# Patient Record
Sex: MALE | Race: WHITE | URBAN - METROPOLITAN AREA
[De-identification: names, ages, dates, MRNs, and addresses within clinical notes are randomized per-mention and may not be internally consistent; named-entity substitution may affect disease eponyms.]

---

## 2019-01-16 ENCOUNTER — APPOINTMENT (RX ONLY)
Dept: URBAN - METROPOLITAN AREA CLINIC 92 | Facility: CLINIC | Age: 84
Setting detail: DERMATOLOGY
End: 2019-01-16

## 2019-01-16 DIAGNOSIS — L27.1 LOCALIZED SKIN ERUPTION DUE TO DRUGS AND MEDICAMENTS TAKEN INTERNALLY: ICD-10-CM

## 2019-01-16 DIAGNOSIS — L29.8 OTHER PRURITUS: ICD-10-CM

## 2019-01-16 DIAGNOSIS — L29.89 OTHER PRURITUS: ICD-10-CM

## 2019-01-16 PROBLEM — L30.9 DERMATITIS, UNSPECIFIED: Status: ACTIVE | Noted: 2019-01-16

## 2019-01-16 PROCEDURE — 99213 OFFICE O/P EST LOW 20 MIN: CPT

## 2019-01-16 PROCEDURE — ? OTHER

## 2019-01-16 PROCEDURE — ? COUNSELING

## 2019-01-16 PROCEDURE — ? TREATMENT REGIMEN

## 2019-01-16 ASSESSMENT — PAIN INTENSITY VAS: HOW INTENSE IS YOUR PAIN 0 BEING NO PAIN, 10 BEING THE MOST SEVERE PAIN POSSIBLE?: NO PAIN

## 2019-01-16 ASSESSMENT — LOCATION DETAILED DESCRIPTION DERM
LOCATION DETAILED: LEFT INFERIOR VERMILION LIP
LOCATION DETAILED: RIGHT INFERIOR LATERAL MIDBACK

## 2019-01-16 ASSESSMENT — LOCATION SIMPLE DESCRIPTION DERM
LOCATION SIMPLE: LEFT LIP
LOCATION SIMPLE: RIGHT LOWER BACK

## 2019-01-16 ASSESSMENT — LOCATION ZONE DERM
LOCATION ZONE: TRUNK
LOCATION ZONE: LIP

## 2019-01-16 ASSESSMENT — SEVERITY ASSESSMENT: SEVERITY: MILD

## 2019-01-16 NOTE — PROCEDURE: OTHER
Note Text (......Xxx Chief Complaint.): This diagnosis correlates with the
Other (Free Text): -pt hasn’t been on any new medications in the last year; he says that he did take amoxicillin for a month a while ago but isn’t sure it’s correlated \\n-discussed possibly that this is caused by certain medications like ibuprofen/ Tylenol/ Advil but pt doesn’t take pain killers often \\n-if unclear with topical steroid samples in 2-3 weeks plan bx
Detail Level: Zone
Other (Free Text): -discussed that there is no visible area of irritation \\n-pt has shingles in the area that is currently itchy
Other (Free Text): there is an erythematous thin scaly plaque on the left cutaneous lip.  The buccal mucosa and tongue appear normal.  Disc possible FDE vs contact vs actinic cheilitis - not as rough as would expect for AK

## 2019-01-16 NOTE — HPI: ITCHING
How Did Your Itching Occur?: gradual in onset  (over a period of years)
How Severe Is Your Itching?: moderate
Additional History: Pt says he is itchy along the right side of his torso.

## 2019-01-16 NOTE — PROCEDURE: TREATMENT REGIMEN
Initiate Treatment: Apply cordran to lip once a day for a week
Detail Level: Zone
Initiate Treatment: Apply cordran to area when itchy

## 2019-01-16 NOTE — HPI: BODY LOCATION - LIP
How Severe Is Your Condition?: moderate
Additional History: Pt is here to check up in a swollen area on his lower lip. He says some days it is more swollen than other doesn’t hurt or bleed and he doesn’t think he started anything new before the swelling began

## 2019-01-30 ENCOUNTER — APPOINTMENT (RX ONLY)
Dept: URBAN - METROPOLITAN AREA CLINIC 92 | Facility: CLINIC | Age: 84
Setting detail: DERMATOLOGY
End: 2019-01-30

## 2019-01-30 DIAGNOSIS — L29.8 OTHER PRURITUS: ICD-10-CM

## 2019-01-30 DIAGNOSIS — L27.1 LOCALIZED SKIN ERUPTION DUE TO DRUGS AND MEDICAMENTS TAKEN INTERNALLY: ICD-10-CM | Status: IMPROVED

## 2019-01-30 DIAGNOSIS — L29.89 OTHER PRURITUS: ICD-10-CM

## 2019-01-30 PROBLEM — L30.9 DERMATITIS, UNSPECIFIED: Status: ACTIVE | Noted: 2019-01-30

## 2019-01-30 PROCEDURE — ? COUNSELING

## 2019-01-30 PROCEDURE — ? OTHER

## 2019-01-30 PROCEDURE — 99213 OFFICE O/P EST LOW 20 MIN: CPT

## 2019-01-30 PROCEDURE — ? TREATMENT REGIMEN

## 2019-01-30 ASSESSMENT — LOCATION DETAILED DESCRIPTION DERM
LOCATION DETAILED: RIGHT INFERIOR LATERAL MIDBACK
LOCATION DETAILED: LEFT INFERIOR VERMILION LIP

## 2019-01-30 ASSESSMENT — SEVERITY ASSESSMENT: SEVERITY: ALMOST CLEAR

## 2019-01-30 ASSESSMENT — LOCATION ZONE DERM
LOCATION ZONE: LIP
LOCATION ZONE: TRUNK

## 2019-01-30 ASSESSMENT — LOCATION SIMPLE DESCRIPTION DERM
LOCATION SIMPLE: RIGHT LOWER BACK
LOCATION SIMPLE: LEFT LIP

## 2019-01-30 ASSESSMENT — PAIN INTENSITY VAS: HOW INTENSE IS YOUR PAIN 0 BEING NO PAIN, 10 BEING THE MOST SEVERE PAIN POSSIBLE?: NO PAIN

## 2019-01-30 NOTE — PROCEDURE: OTHER
Other (Free Text): -pt had hx of shingles in the area that is currently itchy, patient states cordran did not help\\nPatient was not bothered.
Note Text (......Xxx Chief Complaint.): This diagnosis correlates with the
Detail Level: Zone
Other (Free Text): More likely contact but will monitor

## 2019-01-30 NOTE — PROCEDURE: TREATMENT REGIMEN
Plan: Patient has enough samples to last for one more week\\nPatient uses chapstick once in a while, recommended regular Vaseline\\nNo biopsy is needed today
Continue Regimen: Cordran apply to affected area on lip twice a day x 1 more week
Detail Level: Zone